# Patient Record
Sex: FEMALE | Race: WHITE | NOT HISPANIC OR LATINO | URBAN - METROPOLITAN AREA
[De-identification: names, ages, dates, MRNs, and addresses within clinical notes are randomized per-mention and may not be internally consistent; named-entity substitution may affect disease eponyms.]

---
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
## 2024-05-02 ENCOUNTER — OFFICE VISIT (OUTPATIENT)
Dept: URGENT CARE | Facility: CLINIC | Age: 19
End: 2024-05-02
Payer: OTHER GOVERNMENT

## 2024-05-02 VITALS
HEART RATE: 80 BPM | OXYGEN SATURATION: 100 % | WEIGHT: 150 LBS | DIASTOLIC BLOOD PRESSURE: 81 MMHG | SYSTOLIC BLOOD PRESSURE: 129 MMHG | TEMPERATURE: 99 F | HEIGHT: 62 IN | BODY MASS INDEX: 27.6 KG/M2 | RESPIRATION RATE: 20 BRPM

## 2024-05-02 DIAGNOSIS — N63.23 MASS OF LOWER OUTER QUADRANT OF LEFT BREAST: Primary | ICD-10-CM

## 2024-05-02 PROCEDURE — 99213 OFFICE O/P EST LOW 20 MIN: CPT | Mod: ,,, | Performed by: FAMILY MEDICINE

## 2024-05-02 NOTE — PATIENT INSTRUCTIONS
Plan:   Fibroadenoma versus fibrocystic disease      You are being referred to the breast specialist.  They will take over management and treatment.  Likely you will need an ultrasound and or mammogram.  They will call you for an appointment.    Contact this clinic with any concerns

## 2024-05-02 NOTE — PROGRESS NOTES
"Subjective:      Patient ID: Rylee Comeaux is a 18 y.o. female.    Vitals:  height is 5' 2" (1.575 m) and weight is 68 kg (150 lb). Her oral temperature is 98.6 °F (37 °C). Her blood pressure is 129/81 and her pulse is 80. Her respiration is 20 and oxygen saturation is 100%.     Chief Complaint: Breast Mass     Patient is a 18 y.o. female who presents to urgent care with complaints of left breast lump that is painful.  Patient states it first began with left breast pain then later states she palpated a pea size mass in the left lower breast area.  States it is mobile.  Tender to touch.  Feels firm.  Denies any nipple discharge or bloody nipple discharge.  Denies any changes to skin of the breast.  Denies any inverted nipple.  Denies any association with her menstrual cycle.  However states the initial pain she was feeling in the breast has resolved.  Great grandmother with history of breast cancer.  Patient does not have a PCP or OBGYN.  States her mom did make an appointment but it is 6 months out.        Constitution: Negative.   HENT: Negative.     Cardiovascular: Negative.    Eyes: Negative.    Respiratory: Negative.     Gastrointestinal: Negative.    Genitourinary: Negative.    Musculoskeletal: Negative.    Skin: Negative.    Allergic/Immunologic: Negative.    Neurological: Negative.    Hematologic/Lymphatic: Negative.       Objective:     Physical Exam   Constitutional: She is oriented to person, place, and time.  Non-toxic appearance. She does not appear ill. No distress.   HENT:   Head: Normocephalic and atraumatic.   Pulmonary/Chest: Left breast exhibits mass. Left breast exhibits no inverted nipple, no nipple discharge and no skin change.   There is a piece size firm mobile mass palpated near the ulnar side left breast near the outer quadrant.  Tender to palpation.  There are no skin changes.  No inverted nipple.  No nipple discharge             Comments: There is a piece size firm mobile mass palpated " "near the ulnar side left breast near the outer quadrant.  Tender to palpation.  There are no skin changes.  No inverted nipple.  No nipple discharge    Abdominal: Normal appearance.   Neurological: She is alert and oriented to person, place, and time.   Skin: Skin is not diaphoretic.   Psychiatric: Her behavior is normal. Mood, judgment and thought content normal.   Vitals reviewed.chaperone present            Previous History      Review of patient's allergies indicates:  No Known Allergies    Past Medical History:   Diagnosis Date    Kawasaki disease      No current outpatient medications  Past Surgical History:   Procedure Laterality Date    TONSILLECTOMY       Family History   Problem Relation Name Age of Onset    No Known Problems Mother      No Known Problems Father      No Known Problems Sister      No Known Problems Brother         Social History     Tobacco Use    Smoking status: Never    Smokeless tobacco: Never   Substance Use Topics    Alcohol use: Yes     Comment: not even weekly    Drug use: Never        Physical Exam      Vital Signs Reviewed   /81 (BP Location: Right arm)   Pulse 80   Temp 98.6 °F (37 °C) (Oral)   Resp 20   Ht 5' 2" (1.575 m)   Wt 68 kg (150 lb)   LMP 05/01/2024 (Exact Date)   SpO2 100%   BMI 27.44 kg/m²        Procedures    Procedures     Labs   No results found for this or any previous visit.    Assessment:     1. Mass of lower outer quadrant of left breast        Plan:   Fibroadenoma versus fibrocystic disease      You are being referred to the breast specialist.  They will take over management and treatment.  Likely you will need an ultrasound and or mammogram.  They will call you for an appointment.    Contact this clinic with any concerns    Mass of lower outer quadrant of left breast  -     Ambulatory referral/consult to Breast Surgery                    "

## 2024-06-12 RX ORDER — IBUPROFEN 200 MG
200 TABLET ORAL EVERY 6 HOURS PRN
COMMUNITY
End: 2024-06-12

## 2024-06-12 RX ORDER — IBUPROFEN 200 MG
200 TABLET ORAL EVERY 6 HOURS PRN
COMMUNITY

## 2024-06-13 ENCOUNTER — OFFICE VISIT (OUTPATIENT)
Dept: SURGERY | Facility: CLINIC | Age: 19
End: 2024-06-13
Payer: OTHER GOVERNMENT

## 2024-06-13 VITALS
HEART RATE: 87 BPM | TEMPERATURE: 98 F | WEIGHT: 146 LBS | RESPIRATION RATE: 20 BRPM | BODY MASS INDEX: 26.87 KG/M2 | HEIGHT: 62 IN | DIASTOLIC BLOOD PRESSURE: 82 MMHG | OXYGEN SATURATION: 98 % | SYSTOLIC BLOOD PRESSURE: 115 MMHG

## 2024-06-13 DIAGNOSIS — N63.24 BREAST LUMP ON LEFT SIDE AT 7 O'CLOCK POSITION: Primary | ICD-10-CM

## 2024-06-13 PROCEDURE — 99999 PR PBB SHADOW E&M-EST. PATIENT-LVL IV: CPT | Mod: PBBFAC,,, | Performed by: PHYSICIAN ASSISTANT

## 2024-06-13 PROCEDURE — 99214 OFFICE O/P EST MOD 30 MIN: CPT | Mod: PBBFAC | Performed by: PHYSICIAN ASSISTANT

## 2024-06-13 PROCEDURE — 99203 OFFICE O/P NEW LOW 30 MIN: CPT | Mod: S$PBB,,, | Performed by: PHYSICIAN ASSISTANT

## 2024-06-13 NOTE — PROGRESS NOTES
[facility]  Breast Surgical Oncology  New Patient Office Visit - H&P      Referring Provider: Dr. Naun Richardson  PCP: Parish Spear DO   Care Team:  OBGYN: Oleg Bocanegra NP (establishing care soon)    Chief Complaint:   Chief Complaint   Patient presents with    Breast Pain     Patient c/o LIQ left breast intermittent sharp, stabbing pain x 2 episodes while driving, tender to touch, aching pain with weight bearing        Subjective:     HPI:  Rylee Comeaux is a 18 y.o. female who presents on 2024 for evaluation of a tender left breast lump x's 6 months. In January of this year, she noticed a sharp pain in the left breast near the areola. This prompted a self breast exam where she noticed a lump in the inferior breast. The pain does not occur often and is usually only occurs if the lump is pressed. She currently denies any other breast issues including rashes, redness, pain, swelling, nipple discharge, or nipple inversion. Her family history includes a great grandmother with a family history of breast cancer.    Imaging:   None    Pathology:  None    OB/GYN History:  Age at Menarche Onset: 8  Menopausal Status: premenopausal, LMP: Patient's last menstrual period was 2024. Menstrual cycles are usually regular  Hysterectomy/Oophorectomy: no, neither  Hormonal birth control (duration): none  Pregnancy History:   Age at first live birth: n/a  Hormone Replacement Therapy: No, none    Other:  MG breast density: n/a  Prior thoracic RT: none  Genetic testing: none  Ashkenazi Christian descent: No    Family History:  Family History   Problem Relation Name Age of Onset    Kidney cancer Paternal Grandmother  63    Breast cancer Other  60 - 70        Great Grandmother (Bilateral Mastectomy)        Patient History:  Past Medical History:   Diagnosis Date    Kawasaki disease      There are no problems to display for this patient.       Past Surgical History:   Procedure  "Laterality Date    ADENOIDECTOMY      TONSILLECTOMY         Social History     Socioeconomic History    Marital status: Single   Tobacco Use    Smoking status: Never    Smokeless tobacco: Never   Substance and Sexual Activity    Alcohol use: Yes     Comment: Social Drinker-wine or shots of liquor    Drug use: Never         There is no immunization history on file for this patient.    Medications/Allergies:    Current Outpatient Medications:     acetaminophen/pamabrom (MIDOL ORAL), Take by mouth., Disp: , Rfl:     ibuprofen (ADVIL,MOTRIN) 200 MG tablet, Take 200 mg by mouth every 6 (six) hours as needed for Pain., Disp: , Rfl:      Review of patient's allergies indicates:  No Known Allergies    Review of Systems:  Pertinent items are noted in HPI.      Objective:     Vitals:  Vitals:    06/13/24 0836 06/13/24 0839   BP: (!) 125/90 115/82   BP Location: Left arm Left arm   Patient Position: Sitting Sitting   BP Method: Medium (Automatic) Medium (Automatic)   Pulse: 87    Resp: 20    Temp: 98.1 °F (36.7 °C)    TempSrc: Oral    SpO2: 98%    Weight: 66.2 kg (146 lb)    Height: 5' 2" (1.575 m)        Body mass index is 26.7 kg/m².     Physical Exam:  General: The patient is awake, alert and oriented times three. The patient is well nourished and in no acute distress.  Neck: There is no evidence of palpable cervical, supraclavicular or axillary adenopathy. The neck is supple. The thyroid is not enlarged.  Musculoskeletal: The patient has a normal range of motion of her bilateral upper extremities.  Chest: Examination of the chest wall fails to reveal any obvious abnormalities. Nonlabored breathing, symmetric expansion.  Breast:  Right:  Examination of right breast fails to reveal any dominant masses or areas of significant focal nodularity. The nipple is everted without evidence of discharge. There is no skin dimpling with movement of the pectoralis. There are no significant skin changes overlying the breast.   Left: In " the 6-7:00 axis there is a 1-2 cm firm circumscribed freely mobile lump consistent with her area of palpable concern. Examination of the left breast fails to reveal any dominant masses or areas of significant focal nodularity. The nipple is everted without evidence of discharge. There is no skin dimpling with movement of the pectoralis. There are no significant skin changes overlying the breast.  Integumentary: no rashes or skin lesions present  Neurologic: cranial nerves intact, no signs of peripheral neurological deficit, motor/sensory function intact    Assessment:     There is no problem list on file for this patient.       Rylee was seen today for breast pain.    Diagnoses and all orders for this visit:    Breast lump on left side at 7 o'clock position  -     US Breast Left Limited; Future          Plan:        Recommend L Breast limited US to evaluate left breast lump. Clinically most consistent with a fibroadenoma.    RTC after imaging for results and further management.    CC: Dr. Spear      All of her questions were answered. She was advised to call if she develops any questions or concerns.    Precious Perry PA-C        Total time on the date of the visit ranged from 30-44 minutes ([id number]). Total time includes both face-to-face and non-face-to-face time personally spent by myself on the day of the visit.    Non-face-to-face time included:  _X_ preparing to see the patient such as reviewing the patient record  _X_ obtaining and reviewing separately obtained history  _X_ independently interpreting results  _X_ documenting clinical information in electronic health record.    Face-to-face time included:  _X_ performing an appropriate history and examination  _X_ communicating results to the patient  _X_ counseling and educating the patient  __ ordering appropriate medications  _x_ ordering appropriate tests  _X_ ordering appropriate procedures (including follow-up)  _X_ answering any questions the patient  had    Total Time spent on date of visit: 39 minutes

## 2024-06-24 ENCOUNTER — HOSPITAL ENCOUNTER (OUTPATIENT)
Dept: RADIOLOGY | Facility: HOSPITAL | Age: 19
Discharge: HOME | End: 2024-06-24
Attending: PHYSICIAN ASSISTANT
Payer: OTHER GOVERNMENT

## 2024-06-24 VITALS — WEIGHT: 145 LBS | HEIGHT: 62 IN | BODY MASS INDEX: 26.68 KG/M2

## 2024-06-24 DIAGNOSIS — N63.24 BREAST LUMP ON LEFT SIDE AT 7 O'CLOCK POSITION: ICD-10-CM

## 2024-06-24 PROCEDURE — 76641 ULTRASOUND BREAST COMPLETE: CPT | Mod: TC,50

## 2024-10-24 ENCOUNTER — OFFICE VISIT (OUTPATIENT)
Dept: URGENT CARE | Facility: CLINIC | Age: 19
End: 2024-10-24
Payer: OTHER GOVERNMENT

## 2024-10-24 VITALS
BODY MASS INDEX: 27.14 KG/M2 | HEIGHT: 62 IN | SYSTOLIC BLOOD PRESSURE: 130 MMHG | OXYGEN SATURATION: 98 % | RESPIRATION RATE: 18 BRPM | TEMPERATURE: 98 F | WEIGHT: 147.5 LBS | HEART RATE: 75 BPM | DIASTOLIC BLOOD PRESSURE: 68 MMHG

## 2024-10-24 DIAGNOSIS — R19.7 DIARRHEA, UNSPECIFIED TYPE: ICD-10-CM

## 2024-10-24 DIAGNOSIS — R53.83 FATIGUE, UNSPECIFIED TYPE: Primary | ICD-10-CM

## 2024-10-24 LAB
CTP QC/QA: YES
SARS-COV-2 RDRP RESP QL NAA+PROBE: NEGATIVE

## 2024-10-24 NOTE — PATIENT INSTRUCTIONS
COVID test is negative  Hydrate with Pedialyte, Gatorade or powerade. When you decide to eat, keep your diet simple and bland. Bread, crackers, bananas, rice or broth for example. You can take immodium over the counter for non-bloody diarrhea. If there is blood in your diarrhea or vomit, seek medical attention immediately. If you have abdominal pain or worsening of your abdominal pain, seek medical attention immediately in the emergency department. If you have diarrhea >10 episodes per day, abdominal pain, or blood in your stool seek medical attention immediately

## 2024-10-24 NOTE — PROGRESS NOTES
"Subjective:      Patient ID: Rylee Comeaux is a 19 y.o. female.    Vitals:  height is 5' 2" (1.575 m) and weight is 66.9 kg (147 lb 8 oz). Her oral temperature is 98.1 °F (36.7 °C). Her blood pressure is 130/68 and her pulse is 75. Her respiration is 18 and oxygen saturation is 98%.     Chief Complaint: Diarrhea and Fatigue     Patient is a 19 y.o. female who presents to urgent care with complaints of diarrhea, fatigue and no taste x2 days. Alleviating factors include none. Patient denies fever, SOB .  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  She says she is having 1 semi-solid stool per day without blood or mucus.  She denies abdominal pain, dysuria, nausea or vomiting, syncope.     Diarrhea   Pertinent negatives include no abdominal pain, chills, fever or vomiting.   Fatigue  Associated symptoms include fatigue. Pertinent negatives include no abdominal pain, chills, diaphoresis, fever, nausea or vomiting.       Constitution: Positive for fatigue. Negative for chills, sweating and fever.   HENT: Negative.     Neck: neck negative.   Cardiovascular: Negative.    Eyes: Negative.    Respiratory: Negative.     Gastrointestinal:  Positive for diarrhea. Negative for abdominal pain, nausea, vomiting, constipation, bright red blood in stool, dark colored stools and bowel incontinence.   Endocrine: negative.   Genitourinary: Negative.    Musculoskeletal: Negative.    Skin: Negative.    Allergic/Immunologic: Negative.    Neurological: Negative.  Negative for disorientation and altered mental status.   Hematologic/Lymphatic: Negative.    Psychiatric/Behavioral:  Negative for altered mental status, disorientation and confusion.       Objective:     Physical Exam   Constitutional: She is oriented to person, place, and time. She appears well-developed.   HENT:   Head: Normocephalic and atraumatic.   Ears:   Right Ear: External ear normal.   Left Ear: External " "ear normal.   Nose: Nose normal.   Mouth/Throat: Mucous membranes are normal.   Eyes: Conjunctivae and lids are normal.   Neck: Trachea normal. Neck supple.   Cardiovascular: Normal rate, regular rhythm and normal heart sounds.   Pulmonary/Chest: Effort normal and breath sounds normal. No respiratory distress.   Abdominal: Normal appearance and bowel sounds are normal. She exhibits no distension and no mass. Soft. There is no abdominal tenderness. There is no rebound, no guarding, no left CVA tenderness and no right CVA tenderness.   Musculoskeletal: Normal range of motion.         General: Normal range of motion.   Neurological: She is alert and oriented to person, place, and time. She has normal strength.   Skin: Skin is warm, dry, intact, not diaphoretic and not pale.   Psychiatric: Her speech is normal and behavior is normal. Judgment and thought content normal.   Nursing note and vitals reviewed.         Previous History      Review of patient's allergies indicates:  No Known Allergies    Past Medical History:   Diagnosis Date    Kawasaki disease      Current Outpatient Medications   Medication Instructions    acetaminophen/pamabrom (MIDOL ORAL) Take by mouth.    ibuprofen (ADVIL,MOTRIN) 200 mg, Oral, Every 6 hours PRN     Past Surgical History:   Procedure Laterality Date    ADENOIDECTOMY      TONSILLECTOMY       Family History   Problem Relation Name Age of Onset    Kidney cancer Paternal Grandmother  63    Breast cancer Other      Breast cancer Maternal Great-Grandmother  70       Social History     Tobacco Use    Smoking status: Never    Smokeless tobacco: Never   Substance Use Topics    Alcohol use: Yes     Comment: Social Drinker-wine or shots of liquor    Drug use: Never        Physical Exam      Vital Signs Reviewed   /68 (BP Location: Right arm, Patient Position: Sitting)   Pulse 75   Temp 98.1 °F (36.7 °C) (Oral)   Resp 18   Ht 5' 2" (1.575 m)   Wt 66.9 kg (147 lb 8 oz)   SpO2 98%   BMI " 26.98 kg/m²        Procedures    Procedures     Labs     Results for orders placed or performed in visit on 10/24/24   POCT COVID-19 Rapid Screening    Collection Time: 10/24/24 12:03 PM   Result Value Ref Range    POC Rapid COVID Negative Negative     Acceptable Yes       Assessment:     1. Fatigue, unspecified type    2. Diarrhea, unspecified type        Plan:   COVID test is negative  Hydrate with Pedialyte, Gatorade or powerade. When you decide to eat, keep your diet simple and bland. Bread, crackers, bananas, rice or broth for example. You can take immodium over the counter for non-bloody diarrhea. If there is blood in your diarrhea or vomit, seek medical attention immediately. If you have abdominal pain or worsening of your abdominal pain, seek medical attention immediately in the emergency department. If you have diarrhea >10 episodes per day, abdominal pain, or blood in your stool seek medical attention immediately    Fatigue, unspecified type  -     POCT COVID-19 Rapid Screening    Diarrhea, unspecified type

## 2025-01-29 ENCOUNTER — DOCUMENTATION ONLY (OUTPATIENT)
Dept: RADIOLOGY | Facility: HOSPITAL | Age: 20
End: 2025-01-29

## 2025-01-29 NOTE — PROGRESS NOTES
Breast biopsy was recommended on patient's most recent breast imaging. Appointment was scheduled at [facility] but cancelled by the patient related to insurance reasons. To date we have been unable to reschedule this appointment with the patient. A certified letter with copy of report was mailed to the patient notifying her that she can contact [facility] if she would like to reschedule the appointment.

## 2025-03-13 ENCOUNTER — HOSPITAL ENCOUNTER (OUTPATIENT)
Dept: RADIOLOGY | Facility: HOSPITAL | Age: 20
Discharge: HOME | End: 2025-03-13
Attending: PHYSICIAN ASSISTANT
Payer: OTHER GOVERNMENT

## 2025-03-13 DIAGNOSIS — R92.8 ABNORMAL FINDINGS ON DIAGNOSTIC IMAGING OF BREAST: ICD-10-CM

## 2025-03-13 PROCEDURE — 76642 ULTRASOUND BREAST LIMITED: CPT | Mod: TC,50

## 2025-03-13 PROCEDURE — 76642 ULTRASOUND BREAST LIMITED: CPT | Mod: 26,50,, | Performed by: RADIOLOGY

## 2025-03-20 ENCOUNTER — RESULTS FOLLOW-UP (OUTPATIENT)
Dept: SURGERY | Facility: CLINIC | Age: 20
End: 2025-03-20

## 2025-03-27 ENCOUNTER — OFFICE VISIT (OUTPATIENT)
Dept: URGENT CARE | Facility: CLINIC | Age: 20
End: 2025-03-27
Payer: OTHER GOVERNMENT

## 2025-03-27 VITALS
HEIGHT: 62 IN | SYSTOLIC BLOOD PRESSURE: 120 MMHG | RESPIRATION RATE: 20 BRPM | BODY MASS INDEX: 28.34 KG/M2 | DIASTOLIC BLOOD PRESSURE: 86 MMHG | OXYGEN SATURATION: 98 % | HEART RATE: 89 BPM | TEMPERATURE: 98 F | WEIGHT: 154 LBS

## 2025-03-27 DIAGNOSIS — R09.81 NASAL CONGESTION: ICD-10-CM

## 2025-03-27 DIAGNOSIS — J02.9 SORE THROAT: Primary | ICD-10-CM

## 2025-03-27 LAB
CTP QC/QA: YES
MOLECULAR STREP A: NEGATIVE
POC MOLECULAR INFLUENZA A AGN: NEGATIVE
POC MOLECULAR INFLUENZA B AGN: NEGATIVE
SARS-COV-2 RDRP RESP QL NAA+PROBE: NEGATIVE

## 2025-03-27 RX ORDER — DEXAMETHASONE SODIUM PHOSPHATE 10 MG/ML
10 INJECTION INTRAMUSCULAR; INTRAVENOUS
Status: COMPLETED | OUTPATIENT
Start: 2025-03-27 | End: 2025-03-27

## 2025-03-27 RX ORDER — DROSPIRENONE, ETHINYL ESTRADIOL AND LEVOMEFOLATE CALCIUM AND LEVOMEFOLATE CALCIUM 3-0.02(24)
1 KIT ORAL
COMMUNITY

## 2025-03-27 RX ADMIN — DEXAMETHASONE SODIUM PHOSPHATE 10 MG: 10 INJECTION INTRAMUSCULAR; INTRAVENOUS at 10:03

## 2025-03-27 NOTE — PROGRESS NOTES
"Subjective:      Patient ID: Rylee Comeaux is a 19 y.o. female.    Vitals:  height is 5' 2" (1.575 m) and weight is 69.9 kg (154 lb). Her oral temperature is 98.1 °F (36.7 °C). Her blood pressure is 120/86 and her pulse is 89. Her respiration is 20 and oxygen saturation is 98%.     Chief Complaint: No chief complaint on file.     Patient is a 19 y.o. female who presents to urgent care with complaints of sore throat, congestion, body aches since last night. Patient denies fever.      Constitution: Positive for chills. Negative for sweating, fatigue and fever.   HENT:  Positive for congestion and sore throat. Negative for ear pain, ear discharge, postnasal drip, sinus pain, sinus pressure, trouble swallowing and voice change.    Neck: neck negative.   Cardiovascular: Negative.    Eyes: Negative.    Respiratory: Negative.     Gastrointestinal: Negative.    Endocrine: negative.   Genitourinary: Negative.    Musculoskeletal: Negative.    Skin: Negative.    Neurological: Negative.  Negative for disorientation and altered mental status.   Hematologic/Lymphatic: Negative.    Psychiatric/Behavioral:  Negative for altered mental status, disorientation and confusion.       Objective:     Physical Exam   Constitutional: She is oriented to person, place, and time. She appears well-developed. She is cooperative.  Non-toxic appearance. She does not appear ill. No distress.   HENT:   Head: Normocephalic and atraumatic.   Ears:   Right Ear: Hearing, tympanic membrane, external ear and ear canal normal.   Left Ear: Hearing, tympanic membrane, external ear and ear canal normal.   Nose: Congestion present. No mucosal edema, rhinorrhea or nasal deformity. No epistaxis. Right sinus exhibits no maxillary sinus tenderness and no frontal sinus tenderness. Left sinus exhibits no maxillary sinus tenderness and no frontal sinus tenderness.   Mouth/Throat: Uvula is midline, oropharynx is clear and moist and mucous membranes are normal. No " trismus in the jaw. Normal dentition. No uvula swelling. No oropharyngeal exudate, posterior oropharyngeal edema or posterior oropharyngeal erythema.   Eyes: Conjunctivae and lids are normal. No scleral icterus.   Neck: Trachea normal and phonation normal. Neck supple. No edema present. No erythema present. No neck rigidity present.   Cardiovascular: Normal rate, regular rhythm, normal heart sounds and normal pulses.   Pulmonary/Chest: Effort normal and breath sounds normal. No respiratory distress. She has no decreased breath sounds. She has no wheezes. She has no rhonchi. She has no rales.   Abdominal: Normal appearance.   Musculoskeletal: Normal range of motion.         General: No deformity. Normal range of motion.   Neurological: She is alert and oriented to person, place, and time. She exhibits normal muscle tone. Coordination normal.   Skin: Skin is warm, dry, intact, not diaphoretic and not pale.   Psychiatric: Her speech is normal and behavior is normal. Judgment and thought content normal.   Nursing note and vitals reviewed.         Previous History      Review of patient's allergies indicates:  No Known Allergies    Past Medical History:   Diagnosis Date    Kawasaki disease      Current Outpatient Medications   Medication Instructions    acetaminophen/pamabrom (MIDOL ORAL) Take by mouth.    drospirenone-e.estradioL-lm.FA (BEYAZ/LILLIE) 3-0.02-0.451 mg (24) (4) Tab 1 tablet    ibuprofen (ADVIL,MOTRIN) 200 mg, Every 6 hours PRN     Past Surgical History:   Procedure Laterality Date    ADENOIDECTOMY      TONSILLECTOMY       Family History   Problem Relation Name Age of Onset    No Known Problems Mother      Hypertension Father      Familial dysautonomia Father      Kidney cancer Paternal Grandmother  63    Breast cancer Maternal Great-Grandmother  70    Breast cancer Other         Social History[1]     Physical Exam      Vital Signs Reviewed   /86 (BP Location: Right arm, Patient Position: Sitting)    "Pulse 89   Temp 98.1 °F (36.7 °C) (Oral)   Resp 20   Ht 5' 2" (1.575 m)   Wt 69.9 kg (154 lb)   LMP 03/02/2025 (Exact Date)   SpO2 98%   BMI 28.17 kg/m²        Procedures    Procedures     Labs     Results for orders placed or performed in visit on 03/27/25   POCT COVID-19 Rapid Screening    Collection Time: 03/27/25 10:12 AM   Result Value Ref Range    POC Rapid COVID Negative Negative     Acceptable Yes    POCT Strep A, Molecular    Collection Time: 03/27/25 10:13 AM   Result Value Ref Range    Molecular Strep A, POC Negative Negative     Acceptable Yes    POCT Influenza A/B MOLECULAR    Collection Time: 03/27/25 10:16 AM   Result Value Ref Range    POC Molecular Influenza A Ag Negative Negative    POC Molecular Influenza B Ag Negative Negative     Acceptable Yes       Assessment:     1. Sore throat        Plan:   Strep, flu, and COVID testing are negative  Increase fluids intake to prevent dehydration. You may take Tylenol and ibuprofen as directed if needed. Get plenty of rest. May use saline nose spray and humidifer at bedtime. Warm saltwater gargles for sore throat. Warm water with honey to help coat the throat. Throat lozenges. Chloraseptic spray for worsening sore throat. Do not smoke or allow others to smoke around you. Practice good hand hygiene to include frequent hand washing to lessen the likelihood of transmission. Return or seek immediate medical attention for any new or worsening symptoms such as trouble breathing, continued high fever, neck stiffness, rash, or if you do not get better as expected.      Sore throat  -     POCT Strep A, Molecular  -     POCT Influenza A/B MOLECULAR  -     POCT COVID-19 Rapid Screening    Other orders  -     dexAMETHasone injection 10 mg                         [1]   Social History  Tobacco Use    Smoking status: Never     Passive exposure: Never    Smokeless tobacco: Never   Substance Use Topics    Alcohol use: Not " Currently     Comment: Social Drinker-wine or shots of liquor    Drug use: Never

## 2025-03-27 NOTE — PATIENT INSTRUCTIONS
Strep, flu, and COVID testing are negative  Increase fluids intake to prevent dehydration. You may take Tylenol and ibuprofen as directed if needed. Get plenty of rest. May use saline nose spray and humidifer at bedtime. Warm saltwater gargles for sore throat. Warm water with honey to help coat the throat. Throat lozenges. Chloraseptic spray for worsening sore throat. Do not smoke or allow others to smoke around you. Practice good hand hygiene to include frequent hand washing to lessen the likelihood of transmission. Return or seek immediate medical attention for any new or worsening symptoms such as trouble breathing, continued high fever, neck stiffness, rash, or if you do not get better as expected.

## 2025-04-02 ENCOUNTER — HOSPITAL ENCOUNTER (OUTPATIENT)
Dept: RADIOLOGY | Facility: HOSPITAL | Age: 20
Discharge: HOME | End: 2025-04-02
Attending: PHYSICIAN ASSISTANT
Payer: OTHER GOVERNMENT

## 2025-04-02 DIAGNOSIS — R92.8 ABNORMAL MAMMOGRAM: Primary | ICD-10-CM

## 2025-04-02 PROCEDURE — 19083 BX BREAST 1ST LESION US IMAG: CPT | Mod: LT

## 2025-04-02 PROCEDURE — 19083 BX BREAST 1ST LESION US IMAG: CPT | Mod: LT,,, | Performed by: STUDENT IN AN ORGANIZED HEALTH CARE EDUCATION/TRAINING PROGRAM

## 2025-04-03 ENCOUNTER — RESULTS FOLLOW-UP (OUTPATIENT)
Dept: RADIOLOGY | Facility: HOSPITAL | Age: 20
End: 2025-04-03

## 2025-04-03 LAB — PSYCHE PATHOLOGY RESULT: NORMAL

## 2025-04-08 ENCOUNTER — RESULTS FOLLOW-UP (OUTPATIENT)
Dept: SURGERY | Facility: CLINIC | Age: 20
End: 2025-04-08

## 2025-04-11 ENCOUNTER — OFFICE VISIT (OUTPATIENT)
Dept: URGENT CARE | Facility: CLINIC | Age: 20
End: 2025-04-11
Payer: OTHER GOVERNMENT

## 2025-04-11 VITALS
WEIGHT: 153 LBS | SYSTOLIC BLOOD PRESSURE: 124 MMHG | OXYGEN SATURATION: 95 % | RESPIRATION RATE: 16 BRPM | TEMPERATURE: 100 F | HEIGHT: 62 IN | DIASTOLIC BLOOD PRESSURE: 71 MMHG | HEART RATE: 104 BPM | BODY MASS INDEX: 28.16 KG/M2

## 2025-04-11 DIAGNOSIS — R11.2 NAUSEA VOMITING AND DIARRHEA: Primary | ICD-10-CM

## 2025-04-11 DIAGNOSIS — R19.7 NAUSEA VOMITING AND DIARRHEA: Primary | ICD-10-CM

## 2025-04-11 DIAGNOSIS — R50.9 FEVER, UNSPECIFIED FEVER CAUSE: ICD-10-CM

## 2025-04-11 LAB
B-HCG UR QL: NEGATIVE
CTP QC/QA: YES
CTP QC/QA: YES
POC MOLECULAR INFLUENZA A AGN: NEGATIVE
POC MOLECULAR INFLUENZA B AGN: NEGATIVE

## 2025-04-11 RX ORDER — DICYCLOMINE HYDROCHLORIDE 10 MG/1
10 CAPSULE ORAL 3 TIMES DAILY
Qty: 15 CAPSULE | Refills: 0 | Status: SHIPPED | OUTPATIENT
Start: 2025-04-11 | End: 2025-04-16

## 2025-04-11 RX ORDER — SODIUM CHLORIDE, SODIUM LACTATE, POTASSIUM CHLORIDE, CALCIUM CHLORIDE 600; 310; 30; 20 MG/100ML; MG/100ML; MG/100ML; MG/100ML
INJECTION, SOLUTION INTRAVENOUS
Status: COMPLETED | OUTPATIENT
Start: 2025-04-11 | End: 2025-04-11

## 2025-04-11 RX ORDER — ONDANSETRON 4 MG/1
4 TABLET, ORALLY DISINTEGRATING ORAL EVERY 8 HOURS PRN
Qty: 15 TABLET | Refills: 0 | Status: SHIPPED | OUTPATIENT
Start: 2025-04-11

## 2025-04-11 RX ORDER — ACETAMINOPHEN 500 MG
1000 TABLET ORAL
Status: COMPLETED | OUTPATIENT
Start: 2025-04-11 | End: 2025-04-11

## 2025-04-11 RX ADMIN — Medication 1000 MG: at 10:04

## 2025-04-11 RX ADMIN — SODIUM CHLORIDE, SODIUM LACTATE, POTASSIUM CHLORIDE, CALCIUM CHLORIDE: 600; 310; 30; 20 INJECTION, SOLUTION INTRAVENOUS at 10:04

## 2025-04-11 NOTE — PROGRESS NOTES
"Subjective:      Patient ID: Rylee Comeaux is a 19 y.o. female.    Vitals:  height is 5' 2" (1.575 m) and weight is 69.4 kg (153 lb). Her tympanic temperature is 99.9 °F (37.7 °C). Her blood pressure is 124/71 and her pulse is 104. Her respiration is 16 and oxygen saturation is 95%.     Chief Complaint: Emesis     Patient is a 19 y.o. female who presents to urgent care with complaints of vomiting, diarrhea, and nausea since last PM Patient also says feels achy and cold since last PM also.  She reports symptoms started abruptly with nausea around 7:00 p.m. last night.  No relief with Zofran, 2-3 episodes of vomiting last night with abrupt onset diarrhea with vomiting episodes.  She reports diarrhea has been persistent proximally 7 episodes since last night.  She does report abdominal cramping last night associated with vomiting and diarrhea episodes.  Nausea has improved today but loose stools still present.  Denies any melena, hematochezia, hematemesis, recent foreign travel, antibiotic units, raw or undercooked food or seafood as far she is aware.  She was eating pizza with her fiancee prior to onset of symptoms.  He did not develop symptoms after eating pizza.  She does report she transported patient with similar symptoms recently but denies any known close exposure with family members.  Alleviating factors include zofran with no relief.   Temperature 100.8° in clinic.  Denies any urinary symptoms, localized abdominal pain, neck stiffness, rash.  Has had a cycle and does not believe she is pregnant.    Emesis   Associated symptoms include diarrhea.     Gastrointestinal:  Positive for nausea, vomiting and diarrhea. Negative for bright red blood in stool and dark colored stools.      Objective:     Physical Exam   Constitutional: She is oriented to person, place, and time. She appears well-developed.   HENT:   Head: Normocephalic and atraumatic.   Ears:   Right Ear: Tympanic membrane, external ear and ear canal " normal.   Left Ear: Tympanic membrane, external ear and ear canal normal.   Nose: Nose normal.   Mouth/Throat: Mucous membranes are normal. Mucous membranes are moist. No oropharyngeal exudate or posterior oropharyngeal erythema.   Eyes: Conjunctivae and lids are normal.   Neck: Trachea normal. Neck supple.   Cardiovascular: Regular rhythm and normal heart sounds. Tachycardia present.   Pulmonary/Chest: Effort normal and breath sounds normal. No respiratory distress. She has no wheezes. She has no rales.   Abdominal: Normal appearance. She exhibits no distension and no mass. Soft. Bowel sounds are increased. There is no abdominal tenderness. There is no rebound and no guarding.   Musculoskeletal: Normal range of motion.         General: Normal range of motion.   Neurological: She is alert and oriented to person, place, and time. She has normal strength.   Skin: Skin is warm, dry, intact, not diaphoretic and not pale.   Psychiatric: Her speech is normal and behavior is normal. Judgment and thought content normal.   Nursing note and vitals reviewed.      Assessment:     1. Nausea vomiting and diarrhea    2. Fever, unspecified fever cause        Plan:       Nausea vomiting and diarrhea  -     POCT Influenza A/B MOLECULAR  -     lactated ringers infusion  -     ondansetron (ZOFRAN-ODT) 4 MG TbDL; Take 1 tablet (4 mg total) by mouth every 8 (eight) hours as needed (nausea).  Dispense: 15 tablet; Refill: 0  -     dicyclomine (BENTYL) 10 MG capsule; Take 1 capsule (10 mg total) by mouth 3 (three) times daily. for 15 doses  Dispense: 15 capsule; Refill: 0  -     POCT urine pregnancy    Fever, unspecified fever cause  -     acetaminophen tablet 1,000 mg           Mucous membranes still moist however patient is febrile, temperature 100.8°, tachycardia 130-150.  Discussed this is likely related fever as well as food lost due to to nausea, vomiting, diarrhea.  She is requesting IV fluids, given 1 L LR.  Declines need for Zofran  in clinic, tolerates p.o. fluids well.  Given 1 g of Tylenol for fever.  UPT negative.  Patient reports feeling much better after IV fluids.  Lung sounds clear, patient in no distress, tachycardia improved. Vital signs repeated.    Did give precautions for returning back to clinic for stool studies if diarrhea persists greater than 5 episodes today after 3 days of symptoms.      Negative flu swab, did discussed possible other viral etiologies.  Zofran as needed at home.  Increase fluid intake and monitor for signs of dehydration including dark colored urine, weakness, lethargy, dizziness, etc.   Get plenty of rest.   BRAT Diet: Begin eating a BRAT diet as tolerated (bananas, plain rice, apple sauce, plain toast).  Fever / Body Aches: Take OTC Tylenol or Motrin per package instructions as needed.   Diarrhea: Take prescription Bentyl as needed for nonbloody diarrhea.  Follow-up with your Primary Care Provider as needed.   Present to the nearest Emergency Department with any significant change or worsening symptoms including localized or severe abdominal pain.

## 2025-04-11 NOTE — LETTER
April 11, 2025      [facility]  [address]  [place] [address]  Phone: [phone]  Fax: [phone]       Patient: Rylee Rylee Comeaux   YOB: 2005  Date of Visit: 04/11/2025    To Whom It May Concern:    Rylee Comeaux  was at [facility] on 04/11/2025.  Please excuse patient for 04/11/2025-04/12/2025.The patient may return to work/school on 04/13/2025. with no restrictions. If you have any questions or concerns, or if I can be of further assistance, please do not hesitate to contact me.    Sincerely,    Marnie Roldan CMA

## 2025-04-11 NOTE — PATIENT INSTRUCTIONS
Excuse for today and tomorrow    Negative flu swab, did discussed possible other viral etiologies.  Zofran as needed at home.  You were given Tylenol in clinic at 10:30 am.  Increase fluid intake and monitor for signs of dehydration including dark colored urine, weakness, lethargy, dizziness, etc.   Get plenty of rest.   BRAT Diet: Begin eating a BRAT diet as tolerated (bananas, plain rice, apple sauce, plain toast).  Fever / Body Aches: Take OTC Tylenol or Motrin per package instructions as needed.   Diarrhea: Take prescription Bentyl as needed for nonbloody diarrhea.  Follow-up with your Primary Care Provider as needed.   Present to the nearest Emergency Department with any significant change or worsening symptoms including localized or severe abdominal pain.